# Patient Record
Sex: FEMALE | Race: WHITE
[De-identification: names, ages, dates, MRNs, and addresses within clinical notes are randomized per-mention and may not be internally consistent; named-entity substitution may affect disease eponyms.]

---

## 2018-08-17 ENCOUNTER — HOSPITAL ENCOUNTER (INPATIENT)
Dept: HOSPITAL 25 - MCHOBOUT | Age: 28
LOS: 2 days | Discharge: HOME | End: 2018-08-19
Attending: MIDWIFE | Admitting: MIDWIFE
Payer: COMMERCIAL

## 2018-08-17 DIAGNOSIS — F32.9: ICD-10-CM

## 2018-08-17 DIAGNOSIS — Z3A.35: ICD-10-CM

## 2018-08-17 DIAGNOSIS — F41.9: ICD-10-CM

## 2018-08-17 LAB
BASOPHILS # BLD AUTO: 0 10^3/UL (ref 0–0.2)
EOSINOPHIL # BLD AUTO: 0.1 10^3/UL (ref 0–0.6)
HCT VFR BLD AUTO: 39 % (ref 35–47)
HGB BLD-MCNC: 13.4 G/DL (ref 12–16)
LYMPHOCYTES # BLD AUTO: 2.7 10^3/UL (ref 1–4.8)
MCH RBC QN AUTO: 30 PG (ref 27–31)
MCHC RBC AUTO-ENTMCNC: 35 G/DL (ref 31–36)
MCV RBC AUTO: 86 FL (ref 80–97)
MONOCYTES # BLD AUTO: 0.6 10^3/UL (ref 0–0.8)
NEUTROPHILS # BLD AUTO: 7.9 10^3/UL (ref 1.5–7.7)
NRBC # BLD AUTO: 0 10^3/UL
NRBC BLD QL AUTO: 0
PLATELET # BLD AUTO: 213 10^3/UL (ref 150–450)
RBC # BLD AUTO: 4.52 10^6/UL (ref 4–5.4)
WBC # BLD AUTO: 11.3 10^3/UL (ref 3.5–10.8)

## 2018-08-17 PROCEDURE — 84112 EVAL AMNIOTIC FLUID PROTEIN: CPT

## 2018-08-17 PROCEDURE — 85025 COMPLETE CBC W/AUTO DIFF WBC: CPT

## 2018-08-17 PROCEDURE — 36415 COLL VENOUS BLD VENIPUNCTURE: CPT

## 2018-08-17 PROCEDURE — 86901 BLOOD TYPING SEROLOGIC RH(D): CPT

## 2018-08-17 PROCEDURE — 86900 BLOOD TYPING SEROLOGIC ABO: CPT

## 2018-08-17 PROCEDURE — 86850 RBC ANTIBODY SCREEN: CPT

## 2018-08-17 RX ADMIN — DOCUSATE SODIUM SCH: 100 CAPSULE, LIQUID FILLED ORAL at 14:53

## 2018-08-17 RX ADMIN — IBUPROFEN PRN MG: 600 TABLET, FILM COATED ORAL at 17:54

## 2018-08-17 NOTE — HP
General Information





- General Information


Maternal Age: 27


Grav: 2


Para: 1


SAB: 0


IEA: 0





Estimated Due Date: 18


Determined By: LMP


Maternal Blood Type and Rh: O Positive





- Results this Pregnancy


Serology/RPR Result: Non-Reactive


Rubella Result: Immune


HBsAg Result: Negative


HIV Result: Negative





Past Medical History


Delivery History: Hx Uncomplicated Vaginal Delivery


Pertinent Past Medical History: See  Records - asthma- childhood, 

depression/ anxiety, HSV- cold sores


Pertinent Past Surgical History: None


Pertinent Family History: Non-Contributory





- Antepartal Records


Antepartal Records: Reviewed, Pregnancy Uncomplicated





Review of Systems


Constitutional: Uncomfortable


CV Complaint: No


Respiratory: Shortness of Breath: No


Gastrointestinal: No Nausea/Vomiting, Normal Bowel Movement


Genitourinary: Leaking Fluid, No Dysuria, No Bleeding


Musculoskeletal: No Epigastric Pain, Contractions


Neurological: No Headache, No Visual Changes


Fetal Movement: Normal





Exam


Allergies/Adverse Reactions: 


Allergies





latex Allergy (Verified 18 07:49)


 Unknown Reaction Details











T-98.1, P-67, R-16, BP-122/64


Lab Values - Entire Visit: 


 Laboratory Tests











  18





  07:11 07:42 07:42


 


WBC    11.3 H


 


RBC    4.52


 


Hgb    13.4


 


Hct    39


 


MCV    86


 


MCH    30


 


MCHC    35


 


RDW    13


 


Plt Count    213


 


MPV    9.8


 


Neut % (Auto)    70.1


 


Lymph % (Auto)    24.2 L


 


Mono % (Auto)    4.9


 


Eos % (Auto)    0.5


 


Baso % (Auto)    0.3


 


Absolute Neuts (auto)    7.9 H


 


Absolute Lymphs (auto)    2.7


 


Absolute Monos (auto)    0.6


 


Absolute Eos (auto)    0.1


 


Absolute Basos (auto)    0


 


Absolute Nucleated RBC    0


 


Nucleated RBC %    0


 


Vag Amniotic Fld Detect  Positive  


 


Blood Type   O Positive 


 


Antibody Screen   Negative 














- Measurements


Height: 5 ft 7 in


Weight: 70.307 kg


Weight in lbs: 155.842793


Body Mass Index (BMI): 24.3


Pre-Pregnancy Weight: 125 g


Weight Gained This Pregnancy: 154.724 lbs and 0.007 ozs





- Exam


Breast: Breast Exam Deferred


CVA: No CVA Tenderness


Extremities: No Edema


Heart: Normal Rhythm/Heart Sounds


HEENT: No Significant Findings


Lungs: Clear Bilaterally


Rectal: Rectal Exam Deferred


Reflexes: DTR 2+


Thyroid: No Thyromegaly





- Abdominal Exam


Abdomen Exam: Non-Tender, Fundal Height Consistent with Dates





- Ultrasound/Biophysical Profile


Ultrasound Status: Not Done





Targeted Exam Findings


See L&D Outpatient Visit Provider Note for Findings: N/A


Estimated Fetal Weight: 5.5#


Cervical Exam: 4cm


Effacement: 80%


Station: 0


Presenting Part: Vertex


Membrane Status: SROM


Amniotic Fluid Evaluation: Gross Rupture, Positive ROM Plus





EFM Findings





- External Fetal Monitor Findings


Baseline Fetal Heart Rate: 150


External Fetal Monitor Findings: Accelerations Present, No Pattern of Variable 

or Late Decelerations, Variability Moderate, Baseline Stable


Contractions: Regular, Moderate, 45-90 Seconds


Contraction Frequency: 2-4





Assessment/Plan





- Assessment





27 year old  at 35 3/7 weeks gestation with +SROM in active labor. No 

evidence of fetal acidemia.





- Obstetrical Risk Factors


Obstetrical Risk Factors: GBS Unknown, 





- Plan


Plan: Admit - Anticipate Vaginal Delivery


Plan Comment: 





Pt requested and received epidural for pain relief. 





- Date/Time of Admission


Date of Admission: 18


Time of Admission: 08:03

## 2018-08-17 NOTE — PROCNOTE
[facility] OB: Delivery Note





- Delivery


  ** A


Date of Birth: [birth year]


Time of Birth: 11:21


 Sex: Male


[place] Weight at Birth: 2.81 kg


Apgar Score 1 Minute: 9


Apgar Score 5 Minutes: 9


Gestational Age in Weeks and Days at Delivery: 35 Weeks and 3 Days


Delivery Method: Spontaneous Vaginal


Labor: Spontaneous


Did Patient attempt ?: N/A, No Previous 


Amniotic Fluid: Clear


Estimated Blood Loss: 200


Anesthesia/Analgesia: CEI for Labor


Delivered By: [facility]


Level of Nursery: Regular/Bedside





- Perineum


Perineal Injury: None/Intact


Perineal Repair: None





- Events


Delivery Events of Note: Pitocin Only After Delivery, Partial Course of 

Antibiotics





- Risk for Falls


Other Risk for Falls: none





- Additional Delivery Notes


Additional Delivery Notes: 





Pt admitted at 0800 with SROM of clear fluid at 35 3/7 weeks gestation. At time 

of admission, pt was 4cm dilation, so steroids not considered. Prophylactic 

antibiotics initiated on admission, as GBS status unknown.  Pt soon progressed 

into active labor, and requested and received an epidural which provided good 

pain relief. Pt's labor continued to progress. When she reported pressure exam 

found her at full dilation, +2 station. Neonatologist in house, NICU nurse in 

room for delivery. Pt pushed effectively. Infant's head delivered in OA, 

restituting to CRISTIAN with compound hand. Shoulders delivered easily with gentle 

traction, and infant placed on maternal abdomen. Thick cord was clamped after 

pulsation ceased, at approx 4 minutes of life, and cut by infant's father. 

Placenta delivered spontaneously, [place] side with trailing membranes. IV 

Pitocin initiated. Fundas firm with minimal bleeding. Perineum intact. Infant 

was vigorous, skin to skin with mother.

## 2018-08-17 NOTE — PTEDU
Patient Name: IRASEMA BRYANT

MRN: [id number]



IRASEMA BRYANT selected video: BBOB: Nurturing Your Gorgeous &Growing Baby by Breastfeeding to view
 on 08/17/2018 at 7:27:27 PM from [id number]

## 2018-08-18 LAB
BASOPHILS # BLD AUTO: 0 10^3/UL (ref 0–0.2)
EOSINOPHIL # BLD AUTO: 0.1 10^3/UL (ref 0–0.6)
HCT VFR BLD AUTO: 35 % (ref 35–47)
HGB BLD-MCNC: 12 G/DL (ref 12–16)
LYMPHOCYTES # BLD AUTO: 2.8 10^3/UL (ref 1–4.8)
MCH RBC QN AUTO: 30 PG (ref 27–31)
MCHC RBC AUTO-ENTMCNC: 35 G/DL (ref 31–36)
MCV RBC AUTO: 86 FL (ref 80–97)
MONOCYTES # BLD AUTO: 0.7 10^3/UL (ref 0–0.8)
NEUTROPHILS # BLD AUTO: 9.3 10^3/UL (ref 1.5–7.7)
NRBC # BLD AUTO: 0 10^3/UL
NRBC BLD QL AUTO: 0
PLATELET # BLD AUTO: 180 10^3/UL (ref 150–450)
RBC # BLD AUTO: 4.05 10^6/UL (ref 4–5.4)
WBC # BLD AUTO: 13 10^3/UL (ref 3.5–10.8)

## 2018-08-18 RX ADMIN — DOCUSATE SODIUM SCH MG: 100 CAPSULE, LIQUID FILLED ORAL at 08:37

## 2018-08-18 RX ADMIN — IBUPROFEN PRN MG: 600 TABLET, FILM COATED ORAL at 08:38

## 2018-08-18 RX ADMIN — IBUPROFEN PRN MG: 600 TABLET, FILM COATED ORAL at 00:13

## 2018-08-18 RX ADMIN — IBUPROFEN PRN MG: 600 TABLET, FILM COATED ORAL at 22:07

## 2018-08-18 RX ADMIN — DOCUSATE SODIUM SCH MG: 100 CAPSULE, LIQUID FILLED ORAL at 22:08

## 2018-08-18 RX ADMIN — DOCUSATE SODIUM SCH: 100 CAPSULE, LIQUID FILLED ORAL at 02:20

## 2018-08-18 RX ADMIN — IBUPROFEN PRN MG: 600 TABLET, FILM COATED ORAL at 14:30

## 2018-08-18 RX ADMIN — DOCUSATE SODIUM SCH MG: 100 CAPSULE, LIQUID FILLED ORAL at 14:30

## 2018-08-19 VITALS — DIASTOLIC BLOOD PRESSURE: 73 MMHG | SYSTOLIC BLOOD PRESSURE: 116 MMHG

## 2018-08-19 RX ADMIN — IBUPROFEN PRN MG: 600 TABLET, FILM COATED ORAL at 12:41

## 2018-08-19 RX ADMIN — DOCUSATE SODIUM SCH: 100 CAPSULE, LIQUID FILLED ORAL at 12:49

## 2018-08-19 RX ADMIN — IBUPROFEN PRN MG: 600 TABLET, FILM COATED ORAL at 03:53
